# Patient Record
Sex: FEMALE | Race: ASIAN | NOT HISPANIC OR LATINO | URBAN - METROPOLITAN AREA
[De-identification: names, ages, dates, MRNs, and addresses within clinical notes are randomized per-mention and may not be internally consistent; named-entity substitution may affect disease eponyms.]

---

## 2017-04-04 ENCOUNTER — EMERGENCY (EMERGENCY)
Facility: HOSPITAL | Age: 32
LOS: 1 days | Discharge: HOME | End: 2017-04-04
Attending: EMERGENCY MEDICINE | Admitting: EMERGENCY MEDICINE
Payer: MEDICAID

## 2017-04-04 VITALS
OXYGEN SATURATION: 100 % | RESPIRATION RATE: 16 BRPM | SYSTOLIC BLOOD PRESSURE: 105 MMHG | HEART RATE: 78 BPM | DIASTOLIC BLOOD PRESSURE: 42 MMHG | TEMPERATURE: 99 F

## 2017-04-04 PROCEDURE — 99284 EMERGENCY DEPT VISIT MOD MDM: CPT

## 2017-04-04 PROCEDURE — 93971 EXTREMITY STUDY: CPT | Mod: 26,LT

## 2017-04-04 NOTE — ED PROVIDER NOTE - CARE PLAN
Principal Discharge DX:	Leg pain  Instructions for follow-up, activity and diet:	Follow up with your Doctor in 1-2 days and repeat ultrasound within 1 week.  Rest.  Elevate leg.  Return to the ER for any persistent/worsening or new symptoms increased swelling, redness, fevers, chills, chest pain, shortness of breath or any concerning symptoms.

## 2017-04-04 NOTE — ED PROVIDER NOTE - PLAN OF CARE
Follow up with your Doctor in 1-2 days and repeat ultrasound within 1 week.  Rest.  Elevate leg.  Return to the ER for any persistent/worsening or new symptoms increased swelling, redness, fevers, chills, chest pain, shortness of breath or any concerning symptoms.

## 2017-04-04 NOTE — ED PROVIDER NOTE - ATTENDING CONTRIBUTION TO CARE
I performed the initial face to face bedside interview with this patient regarding history of present illness, review of symptoms and past medical, social and family history.  I completed an independent physical examination.  I was the initial provider who evaluated this patient.  The history, review of symptoms and examination was documented by the scribe in my presence and I attest to the accuracy of the documentation.  I have signed out the follow up of any pending tests (i.e. labs, radiological studies) to the PA.  I have discussed the patient’s plan of care and disposition with the PA. MATILDA Arenas MD

## 2017-04-04 NOTE — ED PROVIDER NOTE - PROGRESS NOTE DETAILS
RINKU Louis:(QUID PA) pt feels better ambulating without difficulty.  Results reviewed with patient.  Discharge reviewed and discussed with patient.

## 2017-04-04 NOTE — ED ADULT TRIAGE NOTE - CHIEF COMPLAINT QUOTE
Pt with c/o of right leg pain with swelling noted to the ankle and calf pt travel two weeks ago on a 21 hr flight no c/o of chest pain or sob. She was seen by her PMD and sent in to R/O DVT

## 2017-04-04 NOTE — ED PROVIDER NOTE - NS ED MD SCRIBE ATTENDING SCRIBE SECTIONS
REVIEW OF SYSTEMS/PHYSICAL EXAM/PAST MEDICAL/SURGICAL/SOCIAL HISTORY/HIV/VITAL SIGNS( Pullset)/HISTORY OF PRESENT ILLNESS/DISPOSITION

## 2017-04-04 NOTE — ED PROVIDER NOTE - OBJECTIVE STATEMENT
32 y/o F pt with PMHx of cervical syndrome c/o pressure like pain to right calf x1 weeks. States pain is worsening x3 days and states the pain is worse at night. Also notes swelling but it improved during the day and worse at night. Pt tried ibuprofen with some relief. Pt just came from [facility] 2 weeks ago. Pt visited PMD yesterday, Dr. Karo Ha because she was concerned for a cellulitis. PMD referred pt to the ED for a concern of DVT. PMD also Rx'd pt Clindamycin in case it was an infection. Denies CP, SOB, OCP usage or any other complaints. Allergy to oxycodone. 32 y/o F o/w healthy c/o pressure like pain to right calf x1 weeks. States pain is worsening x3 days and states the pain is worse at night. Also notes swelling but it improved during the day and worse at night. Pt tried ibuprofen with some relief. Pt just came from [facility] 2 weeks ago on a long plane flight. Pt visited PMD yesterday, Dr. Karo Ha. PMD referred pt to the ED for a concern of DVT. PMD also Rx'd pt Clindamycin in case of cellulitis. Denies CP, SOB, OCP usage or any other complaints. Allergy to oxycodone. No fam h/o DVT/PE

## 2017-04-04 NOTE — ED PROVIDER NOTE - MEDICAL DECISION MAKING DETAILS
30 y/o F pt presents to the ED with right calf pain and swelling x1 week s/p recent travel. Concern for DVT. Plan: US.

## 2021-05-21 PROBLEM — G54.2 CERVICAL ROOT DISORDERS, NOT ELSEWHERE CLASSIFIED: Chronic | Status: ACTIVE | Noted: 2017-04-04

## 2021-07-16 ENCOUNTER — APPOINTMENT (OUTPATIENT)
Dept: NEUROLOGY | Facility: CLINIC | Age: 36
End: 2021-07-16
Payer: MEDICAID

## 2021-07-16 DIAGNOSIS — Z82.49 FAMILY HISTORY OF ISCHEMIC HEART DISEASE AND OTHER DISEASES OF THE CIRCULATORY SYSTEM: ICD-10-CM

## 2021-07-16 DIAGNOSIS — Z83.3 FAMILY HISTORY OF DIABETES MELLITUS: ICD-10-CM

## 2021-07-16 DIAGNOSIS — M54.2 CERVICALGIA: ICD-10-CM

## 2021-07-16 PROCEDURE — 99205 OFFICE O/P NEW HI 60 MIN: CPT

## 2021-07-16 RX ORDER — IBUPROFEN 800 MG/1
800 TABLET, FILM COATED ORAL
Refills: 0 | Status: ACTIVE | COMMUNITY

## 2021-07-16 NOTE — ADDENDUM
[FreeTextEntry1] : Patient seen and examined with neurology fellow and above note reviewed in detail and I agree with assessment and plan as outlined. \par Patient will follow up after MRI cervical spine, EMG and blood work.

## 2021-07-16 NOTE — HISTORY OF PRESENT ILLNESS
[FreeTextEntry1] : 36 yo F w/ PMH referred by sister who follows with us as well. Pt is seen for cervical disc syndrome, carpal tunnel, and lumbar disc syndrome. Patient started with pain and numbness on back of neck at 20 years of age. Pain was over entire back and unable to walk for 2 days as a result. Pain improved with medication and MRI done showing cervical disc bulge and herniation. Then developed lower back pain spreading to legs which is so severe that she is unable to walk. MRI of L-spine done showing disc bulge. Also w/ bilateral carpal tunnel for past 8-10 years that has worsened since pregnancy with 7 year old son and since most recent pregnancy and delivery of 2nd child 2 months ago. Patient has trouble holding baby due to the pain. She takes Ibuprofen once a day for the past month (takes BID if severe), which helps for a little while. Has tried Gabapentin, Oxycodone and Meloxicam, muscle relaxants, all prescribed by previous neurologist. She feels the oxycodone/meloxicam was too strong-- had vomiting, dizziness, syncope as a result. \par \par EMG done many years ago. MRI of the neck done a few years back at this point. Seen by rheumatology for diffuse muscle pain throughout body, lupus work-up done and negative. \par \par Current worst symptoms are cervical pain and burning. Took Ibuprofen today for 4-5/10 pain, now improved. \par \par Meds: \par Ibuprofen PRN for pain\par Prenatal vitamin \par Pantoprazole PRN

## 2021-07-16 NOTE — PHYSICAL EXAM
[General Appearance - Alert] : alert [General Appearance - In No Acute Distress] : in no acute distress [General Appearance - Well-Appearing] : healthy appearing [Oriented To Time, Place, And Person] : oriented to person, place, and time [Affect] : the affect was normal [Cranial Nerves Optic (II)] : visual acuity intact bilaterally,  visual fields full to confrontation, pupils equal round and reactive to light [Cranial Nerves Oculomotor (III)] : extraocular motion intact [Cranial Nerves Trigeminal (V)] : facial sensation intact symmetrically [Cranial Nerves Facial (VII)] : face symmetrical [Cranial Nerves Vestibulocochlear (VIII)] : hearing was intact bilaterally [Cranial Nerves Accessory (XI - Cranial And Spinal)] : head turning and shoulder shrug symmetric [Cranial Nerves Hypoglossal (XII)] : there was no tongue deviation with protrusion [Motor Tone] : muscle tone was normal in all four extremities [Involuntary Movements] : no involuntary movements were seen [No Muscle Atrophy] : normal bulk in all four extremities [Sensation Tactile Decrease] : light touch was intact [Abnormal Walk] : normal gait [Balance] : balance was intact [1+] : Ankle jerk right 1+ [2+] : Ankle jerk left 2+ [Neck Appearance] : the appearance of the neck was normal [Paresis Pronator Drift Right-Sided] : no pronator drift on the right [Paresis Pronator Drift Left-Sided] : no pronator drift on the left [Romberg's Sign] : Romberg's sign was negtive [Past-pointing] : there was no past-pointing [Coordination - Dysmetria Impaired Finger-to-Nose Bilateral] : not present [Plantar Reflex Right Only] : normal on the right [Plantar Reflex Left Only] : normal on the left [___] : absent on the right [___] : absent on the left [FreeTextEntry6] : Full proximal UE strength,  strength decreased on R, LE full strength [FreeTextEntry7] : Decreased pin prick median distribution of hand and fingers, otherwise equal in b/l LE and L UE;  Tinnel test positive on R wrist, Phalen positive  [FreeTextEntry8] : Decreased fast finger movements on R  [Extraocular Movements] : extraocular movements were intact [FreeTextEntry1] : Full ROM of neck, no tenderness with movements, Poncho maneuver negative for numbness/tingling, flexion and extension 5/5  [] : no rash [Skin Lesions] : no skin lesions

## 2021-07-16 NOTE — REASON FOR VISIT
[Initial Evaluation] : an initial evaluation [FreeTextEntry1] : tingling and numbness in bilateral arms and hands

## 2021-07-16 NOTE — REVIEW OF SYSTEMS
[Numbness] : numbness [Tingling] : tingling [Dizziness] : dizziness [Eyesight Problems] : eyesight problems [Joint Pain] : joint pain [Joint Stiffness] : joint stiffness [Fever] : no fever [Chills] : no chills [Recent Weight Loss (___ Lbs)] : no recent weight loss [Decr. Concentrating Ability] : no decrease in concentrating ability [Arm Weakness] : no arm weakness [Hand Weakness] : no hand weakness [Leg Weakness] : no leg weakness [Seizures] : no convulsions [Difficulty Walking] : no difficulty walking [Frequent Falls] : not falling [Anxiety] : no anxiety [Depression] : no depression [Loss Of Hearing] : no hearing loss [Chest Pain] : no chest pain [Palpitations] : no palpitations [Shortness Of Breath] : no shortness of breath [Cough] : no cough [Abdominal Pain] : no abdominal pain [Vomiting] : no vomiting [Constipation] : no constipation [Diarrhea] : no diarrhea [Dysuria] : no dysuria [Incontinence] : no incontinence [Negative] : Heme/Lymph [de-identified] : Daily headaches, +photophobia, +phonophobia, no n/v  [FreeTextEntry3] : Occasional blurry vision [FreeTextEntry9] : b/l knee and wrist pain

## 2021-08-11 ENCOUNTER — APPOINTMENT (OUTPATIENT)
Dept: MRI IMAGING | Facility: IMAGING CENTER | Age: 36
End: 2021-08-11

## 2021-10-01 ENCOUNTER — NON-APPOINTMENT (OUTPATIENT)
Age: 36
End: 2021-10-01

## 2021-10-01 ENCOUNTER — APPOINTMENT (OUTPATIENT)
Dept: NEUROLOGY | Facility: CLINIC | Age: 36
End: 2021-10-01
Payer: MEDICAID

## 2021-10-01 DIAGNOSIS — R20.2 ANESTHESIA OF SKIN: ICD-10-CM

## 2021-10-01 DIAGNOSIS — R20.0 ANESTHESIA OF SKIN: ICD-10-CM

## 2021-10-01 PROCEDURE — 95910 NRV CNDJ TEST 7-8 STUDIES: CPT

## 2021-10-01 PROCEDURE — 95886 MUSC TEST DONE W/N TEST COMP: CPT

## 2021-10-01 NOTE — PROCEDURE
[FreeTextEntry1] : Sensory nerve conductions\par Right median sensory nerve action potential had prolonged latency, normal amplitude and decreased conduction velocity.\par \par Left median sensory nerve action potential had normal latency, normal amplitude and decreased conduction velocity.\par \par Bilateral ulnar sensory nerve action potential had normal latency, normal amplitude and normal conduction velocity.\par \par Motor nerve conductions\par Right median nerve compound motor action potential had normal latency, normal amplitude and decreased conduction velocity.\par \par Left median nerve compound motor action potential had normal latency, normal amplitude and decreased conduction velocity.\par \par Right ulnar nerve compound motor action potential had normal latency, normal amplitude and normal conduction velocity.\par \par F-wave latencies for bilateral median and right ulnar nerves were within normal limits.\par \par Needle EMG\par Needle EMG was performed using a disposable concentric needle in the following muscles: \par Deltoid, biceps, edc, fdi, pronator teres had no spontaneous activity and normal motor units. \par Right abductor pollicis brevis had increased duration and polyphasia\par \par Summary: abnormal study. There is electrophysiologic evidence of chronic bilateral sensorimotor median neuropathy. There is no evidence of large fiber neuropathy or cervical radiculopathy. Clinical and radiologic correlation is advised. \par Thank you for the consult,\par Tod Horta MD\par Diplomat, American Board of Neurology and Psychiatry\par

## 2021-10-08 ENCOUNTER — APPOINTMENT (OUTPATIENT)
Dept: MRI IMAGING | Facility: CLINIC | Age: 36
End: 2021-10-08
Payer: MEDICAID

## 2021-10-08 ENCOUNTER — OUTPATIENT (OUTPATIENT)
Dept: OUTPATIENT SERVICES | Facility: HOSPITAL | Age: 36
LOS: 1 days | End: 2021-10-08
Payer: MEDICAID

## 2021-10-08 DIAGNOSIS — R20.0 ANESTHESIA OF SKIN: ICD-10-CM

## 2021-10-08 PROCEDURE — 72141 MRI NECK SPINE W/O DYE: CPT

## 2021-10-08 PROCEDURE — 72141 MRI NECK SPINE W/O DYE: CPT | Mod: 26

## 2021-10-25 ENCOUNTER — APPOINTMENT (OUTPATIENT)
Dept: NEUROLOGY | Facility: CLINIC | Age: 36
End: 2021-10-25

## 2023-04-10 NOTE — ED ADULT TRIAGE NOTE - TEMPERATURE IN FAHRENHEIT (DEGREES F)
98.6 Add High Risk Medication Management Associated Diagnosis?: No Patient Reported Weight(Optional But Include Units): 150 Detail Level: Zone Length Of Therapy: 2 years

## 2023-12-22 ENCOUNTER — NON-APPOINTMENT (OUTPATIENT)
Age: 38
End: 2023-12-22

## 2024-02-25 ENCOUNTER — EMERGENCY (EMERGENCY)
Facility: HOSPITAL | Age: 39
LOS: 1 days | Discharge: HOME | End: 2024-02-25
Attending: EMERGENCY MEDICINE | Admitting: EMERGENCY MEDICINE
Payer: MEDICAID

## 2024-02-25 VITALS
OXYGEN SATURATION: 100 % | DIASTOLIC BLOOD PRESSURE: 86 MMHG | TEMPERATURE: 98 F | RESPIRATION RATE: 17 BRPM | HEART RATE: 75 BPM | SYSTOLIC BLOOD PRESSURE: 137 MMHG

## 2024-02-25 VITALS
OXYGEN SATURATION: 100 % | SYSTOLIC BLOOD PRESSURE: 101 MMHG | RESPIRATION RATE: 18 BRPM | DIASTOLIC BLOOD PRESSURE: 69 MMHG | HEART RATE: 79 BPM | TEMPERATURE: 98 F

## 2024-02-25 LAB
ALBUMIN SERPL ELPH-MCNC: 3.9 G/DL — SIGNIFICANT CHANGE UP (ref 3.3–5)
ALP SERPL-CCNC: 92 U/L — SIGNIFICANT CHANGE UP (ref 40–120)
ALT FLD-CCNC: 9 U/L — SIGNIFICANT CHANGE UP (ref 4–33)
ANION GAP SERPL CALC-SCNC: 8 MMOL/L — SIGNIFICANT CHANGE UP (ref 7–14)
AST SERPL-CCNC: 13 U/L — SIGNIFICANT CHANGE UP (ref 4–32)
BASOPHILS # BLD AUTO: 0.02 K/UL — SIGNIFICANT CHANGE UP (ref 0–0.2)
BASOPHILS NFR BLD AUTO: 0.3 % — SIGNIFICANT CHANGE UP (ref 0–2)
BILIRUB SERPL-MCNC: 0.3 MG/DL — SIGNIFICANT CHANGE UP (ref 0.2–1.2)
BUN SERPL-MCNC: 13 MG/DL — SIGNIFICANT CHANGE UP (ref 7–23)
CALCIUM SERPL-MCNC: 9 MG/DL — SIGNIFICANT CHANGE UP (ref 8.4–10.5)
CHLORIDE SERPL-SCNC: 104 MMOL/L — SIGNIFICANT CHANGE UP (ref 98–107)
CO2 SERPL-SCNC: 26 MMOL/L — SIGNIFICANT CHANGE UP (ref 22–31)
CREAT SERPL-MCNC: 0.76 MG/DL — SIGNIFICANT CHANGE UP (ref 0.5–1.3)
EGFR: 103 ML/MIN/1.73M2 — SIGNIFICANT CHANGE UP
EOSINOPHIL # BLD AUTO: 0.02 K/UL — SIGNIFICANT CHANGE UP (ref 0–0.5)
EOSINOPHIL NFR BLD AUTO: 0.3 % — SIGNIFICANT CHANGE UP (ref 0–6)
GLUCOSE SERPL-MCNC: 92 MG/DL — SIGNIFICANT CHANGE UP (ref 70–99)
HCG SERPL-ACNC: <1 MIU/ML — SIGNIFICANT CHANGE UP
HCT VFR BLD CALC: 41.3 % — SIGNIFICANT CHANGE UP (ref 34.5–45)
HGB BLD-MCNC: 13 G/DL — SIGNIFICANT CHANGE UP (ref 11.5–15.5)
IANC: 4.74 K/UL — SIGNIFICANT CHANGE UP (ref 1.8–7.4)
IMM GRANULOCYTES NFR BLD AUTO: 0.3 % — SIGNIFICANT CHANGE UP (ref 0–0.9)
LYMPHOCYTES # BLD AUTO: 1.67 K/UL — SIGNIFICANT CHANGE UP (ref 1–3.3)
LYMPHOCYTES # BLD AUTO: 24.6 % — SIGNIFICANT CHANGE UP (ref 13–44)
MCHC RBC-ENTMCNC: 24.5 PG — LOW (ref 27–34)
MCHC RBC-ENTMCNC: 31.5 GM/DL — LOW (ref 32–36)
MCV RBC AUTO: 77.9 FL — LOW (ref 80–100)
MONOCYTES # BLD AUTO: 0.33 K/UL — SIGNIFICANT CHANGE UP (ref 0–0.9)
MONOCYTES NFR BLD AUTO: 4.9 % — SIGNIFICANT CHANGE UP (ref 2–14)
NEUTROPHILS # BLD AUTO: 4.74 K/UL — SIGNIFICANT CHANGE UP (ref 1.8–7.4)
NEUTROPHILS NFR BLD AUTO: 69.6 % — SIGNIFICANT CHANGE UP (ref 43–77)
NRBC # BLD: 0 /100 WBCS — SIGNIFICANT CHANGE UP (ref 0–0)
NRBC # FLD: 0 K/UL — SIGNIFICANT CHANGE UP (ref 0–0)
PLATELET # BLD AUTO: 222 K/UL — SIGNIFICANT CHANGE UP (ref 150–400)
POTASSIUM SERPL-MCNC: 4.4 MMOL/L — SIGNIFICANT CHANGE UP (ref 3.5–5.3)
POTASSIUM SERPL-SCNC: 4.4 MMOL/L — SIGNIFICANT CHANGE UP (ref 3.5–5.3)
PROT SERPL-MCNC: 7.2 G/DL — SIGNIFICANT CHANGE UP (ref 6–8.3)
RBC # BLD: 5.3 M/UL — HIGH (ref 3.8–5.2)
RBC # FLD: 14 % — SIGNIFICANT CHANGE UP (ref 10.3–14.5)
SODIUM SERPL-SCNC: 138 MMOL/L — SIGNIFICANT CHANGE UP (ref 135–145)
TROPONIN T, HIGH SENSITIVITY RESULT: <6 NG/L — SIGNIFICANT CHANGE UP
WBC # BLD: 6.8 K/UL — SIGNIFICANT CHANGE UP (ref 3.8–10.5)
WBC # FLD AUTO: 6.8 K/UL — SIGNIFICANT CHANGE UP (ref 3.8–10.5)

## 2024-02-25 PROCEDURE — 93010 ELECTROCARDIOGRAM REPORT: CPT

## 2024-02-25 PROCEDURE — 99285 EMERGENCY DEPT VISIT HI MDM: CPT

## 2024-02-25 PROCEDURE — 71045 X-RAY EXAM CHEST 1 VIEW: CPT | Mod: 26

## 2024-02-25 NOTE — ED PROVIDER NOTE - CLINICAL SUMMARY MEDICAL DECISION MAKING FREE TEXT BOX
Triage not, past visits, and EKG reviewed. EKG unremarkable, normal sinus rhythm. Despite this, patient presentation is most concerning for ACS. GERD is also possible, as well as food poisoning or viral gastroenteritis, however will ro ACS. Will order trop, CHX, CBC, CMP, HCG. Pt received 2 aspirin with EMS. She denies need for pain control at this time. Will reassess.

## 2024-02-25 NOTE — ED PROVIDER NOTE - OBJECTIVE STATEMENT
38-year-old female with no stated PMH presents to the ED with complaints of sudden onset chest pain this morning that awoke her from sleep.  Patient states that a sudden crushing pain that spread across the left side of her chest into her left arm and up her to her jaw woke her from sleep at around 6 AM.  She was sweating, SOB and was nauseous.  Patient tried to vomit because she felt like that would make her feel better but was unable to vomit.  She did have 3 episodes of diarrhea, watery.  Patient felt fine yesterday. She has noticed increasing SOB with exertion for the last several months, she is unsure if it is because she has gained some weight. At time of evaluation, patient is back to her baseline, no pain no SOB. No recent fevers, chills, congestion, sore throat, cough, recent travel, sick contacts. Pt states she was supposed to do a stress test but never did. She also says she hasn't seen a doctor since her last pregnancy 2 years ago.

## 2024-02-25 NOTE — ED PROVIDER NOTE - PATIENT PORTAL LINK FT
You can access the FollowMyHealth Patient Portal offered by [facility] by registering at the following website: [email or web address]. By joining [employer]’s FollowMyHealth portal, you will also be able to view your health information using other applications (apps) compatible with our system.

## 2024-02-25 NOTE — ED PROVIDER NOTE - NSFOLLOWUPINSTRUCTIONS_ED_ALL_ED_FT
1. You presented to the emergency department for: chest pain.    2. Your evaluation in the emergency department included a physician evaluation. Your work-up did not reveal any findings indicating the need for admission to the hospital or any emergent interventions at this time.     3. It is recommended that you follow-up with your primary care provider within one to two weeks.     If needed, to arrange an appointment with a primary care provider please call: [phone]    4. Please continue taking your regular medications as prescribed.     For pain you may take 400-600 mg IBUPROFEN or 500-1000mg ACETAMINOPHEN every 6-8 hours - as needed.  This is an over-the-counter medication - please read the instructions for use and warnings on the label. If you have any questions regarding its use, you may refer them to your local pharmacist.    5. PLEASE RETURN TO THE EMERGENCY DEPARTMENT IMMEDIATELY IF you develop any fevers not responding to over the counter medications, uncontrollable nausea and vomiting, an inability to tolerate eating and drinking, difficulty breathing, chest pain, a severe increase in your symptoms or pain, or any other new symptoms that concern you.

## 2024-02-25 NOTE — ED ADULT TRIAGE NOTE - CHIEF COMPLAINT QUOTE
pt complains of CP, nausea, chills and diarrhea since 6 am.  pt given 324mg of ASA enroute. pt denies palpitations  and dizziness. pt hx of GERD

## 2024-02-25 NOTE — ED ADULT NURSE NOTE - OBJECTIVE STATEMENT
Pt received to intake. Pt is A&Ox3, ambulatory, Hx of GERD. Pt presents to ED after waking up from chest pain this morning. states pain is on left side and radiates to neck. Pt reports chest pain has improved since this morning. Denies SOB, headache, dizziness, abdominal pain, fevers/chills. breathing is even and nonlabored. right AC 20g IV placed, labs collected and sent. awaiting imaging. Stretcher set in lowest position, call bell within reach, safety maintained.

## 2024-02-25 NOTE — ED PROVIDER NOTE - ATTENDING CONTRIBUTION TO CARE
HPI: 38-year-old female with no stated PMH presents to the ED with complaints of sudden onset chest pain this morning that awoke her from sleep.  Patient states that a sudden crushing pain that spread across the left side of her chest into her left arm and up her to her jaw woke her from sleep at around 6 AM.  She was sweating, SOB and was nauseous.  Patient tried to vomit because she felt like that would make her feel better but was unable to vomit.  She did have 3 episodes of diarrhea, watery.  Patient felt fine yesterday. She has noticed increasing SOB with exertion for the last several months, she is unsure if it is because she has gained some weight. At time of evaluation, patient is back to her baseline, no pain no SOB. No recent fevers, chills, congestion, sore throat, cough, recent travel, sick contacts. Pt states she was supposed to do a stress test but never did. She also says she hasn't seen a doctor since her last pregnancy 2 years ago.    EXAM: NAD, heart RRR, lungs ctab, abd soft nontender.     MDM: pt with no reported Past Medical History that presents with chest pain that woke her up from sleep this morning. Appears well and benign exam. EKG taken in triage is NSR without ischemic changes or arrthyhmias reviewed by me. Pt likely had GERD given GI symptoms afterwards. Will obtain labs to r/o ACS although unlikely given no history of HTN, HLD or other risk factors. No SOB at this time but has had for several months, less likely PE as no risk factors and duration of symptoms is unlikely to be PE as would have propogated at this time. Will work up and reassess.
